# Patient Record
(demographics unavailable — no encounter records)

---

## 2024-11-19 NOTE — BEGINNING OF VISIT
[Patient] : patient [] :  [[employer] ] : provided by [employer]   [Time Spent: ____ minutes] : Total time spent using  services: [unfilled] minutes. The patient's primary language is not English thus required  services. [Interpreters_IDNumber] : [id number] [TWNoteComboBox1] : Azerbaijani

## 2024-11-19 NOTE — HISTORY OF PRESENT ILLNESS
[FreeTextEntry6] : 16 y.o female brought down by school guidance counselor  Kady moved from [place] 2 years ago  Currently attends [employer], in the 10th grade  NKDA Denies PMH,SX LMP approx 6 months ago   Currently in a relationship with male partner Mom aware of pregnancy, has upcoming appointment 24

## 2024-11-19 NOTE — DISCUSSION/SUMMARY
[FreeTextEntry1] : 16 y.o female presents to health center to establish care V/S stable  NKDA Dispensed and labeled #20 prenatal vitamins, take one daily  Counseling/education provided on health maintenance and promotion  Discussed important of prenatal care  Answered all questions  reviewed immunizations, UTD D/C to class, advise to RTC as needed

## 2024-12-06 NOTE — PHYSICAL EXAM
[Fully active, able to carry on all pre-disease performance without restriction] : Status 0 - Fully active, able to carry on all pre-disease performance without restriction [Normal] : affect appropriate [de-identified] : 31 weeks pregnant  [de-identified] : 31 weeks pregnant

## 2024-12-06 NOTE — PHYSICAL EXAM
[Fully active, able to carry on all pre-disease performance without restriction] : Status 0 - Fully active, able to carry on all pre-disease performance without restriction [Normal] : affect appropriate [de-identified] : 31 weeks pregnant  [de-identified] : 31 weeks pregnant

## 2024-12-06 NOTE — ASSESSMENT
[FreeTextEntry1] :  15 yo F presents to the office today for initial consultation for Anemia. Referred by OBGYN . Uzbek Speaker [phone] Kenny. A0, Due date- Not sure , 31 weeks (boy). This is the first time she was told she was Anemic. She has never taken PO Iron before. She never had a BLT. She had an Iron Infusion in the Maternity center only got 1 infusion. Menstrual cycle- regular, 5 days, 2-3 days heavy bleeding. Diet- poor diet in vegetables and red meat, eats mostly chicken. Pt states Pica Ice  Impression: Anemia in Pregnancy   Plan: Previous chart and previous labs reviewed. Labs on 24 reviewed and discussed with patient. WBC 8.34, Hgb 7.9, Hct 28.9, , MCV 70.3 HgbE- Hgb A- 98.1, HgbA2 1.9. --- Today ordered labs: CBC, Ferritin, TIBC, B12, Folate, Retic, MMA   --- Labs to be done 3 days prior when they RTC: CBC, Ferritin, TIBC  --- Schedule for Iron Infusion Venofer 200mg IV weekly x5 --- Pt will call us for lab results --- Pt was educated on a high iron diet --- Pt advised to follow up with their OBGYN, PCP as directed We explained possible side effect from Iron infusion is not limited to anaphylactic reaction, headache, hives, itching wheezing, difficulty breathing, a lightheaded feeling, swelling of face, lips, tongue or throat, delay reaction and abdominal discomfort. All questions were answered   RTC 1st weeks of March with JOBY Sanz

## 2024-12-06 NOTE — HISTORY OF PRESENT ILLNESS
[FreeTextEntry1] : CRISTO f/u Note  #[id number] Ms. Jurado is a 17 yo  @32w2d, LARON 25 by 31 week biometry, seen today for follow up of poor pregnancy dating. Pt managed by Dr. Montiel.  Pt first seen on  for initial scan. LMP unknown, pt late transfer with no care. Exact LARON uncertain, concern for possible IUGR.   Repeat BPN today 10/10, overall reassuring.   Pt is following with hematology for IV venofer infusion for anemia She has not completed her GCT, a script was provided today  I spoke with her today about the findings and all questions were answered to her satisfaction.  Continue weekly BPN, growth in 1 week to evaluate dating once again. routine care per Dr. Dinesh Jaeger MD Maternal Fetal Medicine  Total time spent 25 minutes with greater than 50% of time spent on counseling and coordination of care, excluding teaching and separately reported services.

## 2024-12-06 NOTE — ASSESSMENT
[FreeTextEntry1] :  15 yo F presents to the office today for initial consultation for Anemia. Referred by OBGYN . Czech Speaker [phone] Kenny. A0, Due date- Not sure , 31 weeks (boy). This is the first time she was told she was Anemic. She has never taken PO Iron before. She never had a BLT. She had an Iron Infusion in the Maternity center only got 1 infusion. Menstrual cycle- regular, 5 days, 2-3 days heavy bleeding. Diet- poor diet in vegetables and red meat, eats mostly chicken. Pt states Pica Ice  Impression: Anemia in Pregnancy   Plan: Previous chart and previous labs reviewed. Labs on 24 reviewed and discussed with patient. WBC 8.34, Hgb 7.9, Hct 28.9, , MCV 70.3 HgbE- Hgb A- 98.1, HgbA2 1.9. --- Today ordered labs: CBC, Ferritin, TIBC, B12, Folate, Retic, MMA   --- Labs to be done 3 days prior when they RTC: CBC, Ferritin, TIBC  --- Schedule for Iron Infusion Venofer 200mg IV weekly x5 --- Pt will call us for lab results --- Pt was educated on a high iron diet --- Pt advised to follow up with their OBGYN, PCP as directed We explained possible side effect from Iron infusion is not limited to anaphylactic reaction, headache, hives, itching wheezing, difficulty breathing, a lightheaded feeling, swelling of face, lips, tongue or throat, delay reaction and abdominal discomfort. All questions were answered   RTC 1st weeks of March with JOBY Sanz

## 2024-12-06 NOTE — HISTORY OF PRESENT ILLNESS
[Disease:__________________________] : Disease: [unfilled] [de-identified] : 17 yo F presents to the office today for initial consultation for Anemia. Referred by OBGYN . [place] citizen Speaker [phone] Kenny. A0, Due date- Not sure , 31 week (boy) Pt has a PMHx of none. Pt has a PSHx none. Pt has a FHx of Mother diabetes  Social History: non-drinker, non-drinker, single,    This is the first time she was told she was Anemic  She has never taken PO Iron before She never had a BLT She had an Iron Infusion in the Maternity center only got 1 infusion  Menstrual cycle- regular, 5 days, 2-3 days heavy bleeding Medication- Prenatal  Allergies- none Diet- poor diet in vegetables and red meat, eats mostly chicken   Pt states Pica Ice  Pt denies fever, diarrhea, fatigue, chills, nausea, vomiting, SOB, dyspnea, unintentional weight loss or bleeding. Pt denies palpitations, headache, weakness, dizziness.  Pt has not seen any blood in the stools or melena. No epistaxis, hematemesis or hemoptysis.   Healthcare Maintenace: PCP-   OBGYN- .  GI- Will find one  Colonoscopy- never Upper Endoscopy- never Mammography- never GYN Pelvic Exam/pap- never Breast Exam- never LMP- can't remember GPA0- A0 Due date- Not sure  31 weeks (boy)  Labs on 24 reviewed and discussed with patient. WBC 8.34, Hgb 7.9, Hct 28.9, , MCV 70.3 HgbE- Hgb A- 98.1, HgbA2 1.9

## 2024-12-06 NOTE — PHYSICAL EXAM
[Fully active, able to carry on all pre-disease performance without restriction] : Status 0 - Fully active, able to carry on all pre-disease performance without restriction [Normal] : affect appropriate [de-identified] : 31 weeks pregnant  [de-identified] : 31 weeks pregnant

## 2024-12-06 NOTE — HISTORY OF PRESENT ILLNESS
[Disease:__________________________] : Disease: [unfilled] [de-identified] : 17 yo F presents to the office today for initial consultation for Anemia. Referred by OBGYN . Niuean Speaker [phone] Kenny. A0, Due date- Not sure , 31 week (boy) Pt has a PMHx of none. Pt has a PSHx none. Pt has a FHx of Mother diabetes  Social History: non-drinker, non-drinker, single,    This is the first time she was told she was Anemic  She has never taken PO Iron before She never had a BLT She had an Iron Infusion in the Maternity center only got 1 infusion  Menstrual cycle- regular, 5 days, 2-3 days heavy bleeding Medication- Prenatal  Allergies- none Diet- poor diet in vegetables and red meat, eats mostly chicken   Pt states Pica Ice  Pt denies fever, diarrhea, fatigue, chills, nausea, vomiting, SOB, dyspnea, unintentional weight loss or bleeding. Pt denies palpitations, headache, weakness, dizziness.  Pt has not seen any blood in the stools or melena. No epistaxis, hematemesis or hemoptysis.   Healthcare Maintenace: PCP-   OBGYN- .  GI- Will find one  Colonoscopy- never Upper Endoscopy- never Mammography- never GYN Pelvic Exam/pap- never Breast Exam- never LMP- can't remember GPA0- A0 Due date- Not sure  31 weeks (boy)  Labs on 24 reviewed and discussed with patient. WBC 8.34, Hgb 7.9, Hct 28.9, , MCV 70.3 HgbE- Hgb A- 98.1, HgbA2 1.9

## 2024-12-06 NOTE — HISTORY OF PRESENT ILLNESS
[FreeTextEntry1] : CRISTO f/u Note  #[id number] Ms. Jurado is a 15 yo  @32w2d, LARON 25 by 31 week biometry, seen today for follow up of poor pregnancy dating. Pt managed by Dr. Montiel.  Pt first seen on  for initial scan. LMP unknown, pt late transfer with no care. Exact LARON uncertain, concern for possible IUGR.   Repeat BPN today 10/10, overall reassuring.   Pt is following with hematology for IV venofer infusion for anemia She has not completed her GCT, a script was provided today  I spoke with her today about the findings and all questions were answered to her satisfaction.  Continue weekly BPN, growth in 1 week to evaluate dating once again. routine care per Dr. Dinesh Jaeger MD Maternal Fetal Medicine  Total time spent 25 minutes with greater than 50% of time spent on counseling and coordination of care, excluding teaching and separately reported services.

## 2024-12-06 NOTE — HISTORY OF PRESENT ILLNESS
[Disease:__________________________] : Disease: [unfilled] [de-identified] : 15 yo F presents to the office today for initial consultation for Anemia. Referred by OBGYN . Uz[place] Speaker [phone] Kenny. A0, Due date- Not sure , 31 week (boy) Pt has a PMHx of none. Pt has a PSHx none. Pt has a FHx of Mother diabetes  Social History: non-drinker, non-drinker, single,    This is the first time she was told she was Anemic  She has never taken PO Iron before She never had a BLT She had an Iron Infusion in the Maternity center only got 1 infusion  Menstrual cycle- regular, 5 days, 2-3 days heavy bleeding Medication- Prenatal  Allergies- none Diet- poor diet in vegetables and red meat, eats mostly chicken   Pt states Pica Ice  Pt denies fever, diarrhea, fatigue, chills, nausea, vomiting, SOB, dyspnea, unintentional weight loss or bleeding. Pt denies palpitations, headache, weakness, dizziness.  Pt has not seen any blood in the stools or melena. No epistaxis, hematemesis or hemoptysis.   Healthcare Maintenace: PCP-   OBGYN- .  GI- Will find one  Colonoscopy- never Upper Endoscopy- never Mammography- never GYN Pelvic Exam/pap- never Breast Exam- never LMP- can't remember GPA0- A0 Due date- Not sure  31 weeks (boy)  Labs on 24 reviewed and discussed with patient. WBC 8.34, Hgb 7.9, Hct 28.9, , MCV 70.3 HgbE- Hgb A- 98.1, HgbA2 1.9

## 2024-12-06 NOTE — PHYSICAL EXAM
[Fully active, able to carry on all pre-disease performance without restriction] : Status 0 - Fully active, able to carry on all pre-disease performance without restriction [Normal] : affect appropriate [de-identified] : 31 weeks pregnant  [de-identified] : 31 weeks pregnant

## 2024-12-06 NOTE — ASSESSMENT
[FreeTextEntry1] :  15 yo F presents to the office today for initial consultation for Anemia. Referred by OBGYN . Sinhala Speaker [phone] Kenny. A0, Due date- Not sure , 31 weeks (boy). This is the first time she was told she was Anemic. She has never taken PO Iron before. She never had a BLT. She had an Iron Infusion in the Maternity center only got 1 infusion. Menstrual cycle- regular, 5 days, 2-3 days heavy bleeding. Diet- poor diet in vegetables and red meat, eats mostly chicken. Pt states Pica Ice  Impression: Anemia in Pregnancy   Plan: Previous chart and previous labs reviewed. Labs on 24 reviewed and discussed with patient. WBC 8.34, Hgb 7.9, Hct 28.9, , MCV 70.3 HgbE- Hgb A- 98.1, HgbA2 1.9. --- Today ordered labs: CBC, Ferritin, TIBC, B12, Folate, Retic, MMA   --- Labs to be done 3 days prior when they RTC: CBC, Ferritin, TIBC  --- Schedule for Iron Infusion Venofer 200mg IV weekly x5 --- Pt will call us for lab results --- Pt was educated on a high iron diet --- Pt advised to follow up with their OBGYN, PCP as directed We explained possible side effect from Iron infusion is not limited to anaphylactic reaction, headache, hives, itching wheezing, difficulty breathing, a lightheaded feeling, swelling of face, lips, tongue or throat, delay reaction and abdominal discomfort. All questions were answered   RTC 1st weeks of March with JOBY Sanz

## 2024-12-06 NOTE — ASSESSMENT
[FreeTextEntry1] :  17 yo F presents to the office today for initial consultation for Anemia. Referred by OBGYN . Belarusian Speaker [phone] Kenny. A0, Due date- Not sure , 31 weeks (boy). This is the first time she was told she was Anemic. She has never taken PO Iron before. She never had a BLT. She had an Iron Infusion in the Maternity center only got 1 infusion. Menstrual cycle- regular, 5 days, 2-3 days heavy bleeding. Diet- poor diet in vegetables and red meat, eats mostly chicken. Pt states Pica Ice  Impression: Anemia in Pregnancy   Plan: Previous chart and previous labs reviewed. Labs on 24 reviewed and discussed with patient. WBC 8.34, Hgb 7.9, Hct 28.9, , MCV 70.3 HgbE- Hgb A- 98.1, HgbA2 1.9. --- Today ordered labs: CBC, Ferritin, TIBC, B12, Folate, Retic, MMA   --- Labs to be done 3 days prior when they RTC: CBC, Ferritin, TIBC  --- Schedule for Iron Infusion Venofer 200mg IV weekly x5 --- Pt will call us for lab results --- Pt was educated on a high iron diet --- Pt advised to follow up with their OBGYN, PCP as directed We explained possible side effect from Iron infusion is not limited to anaphylactic reaction, headache, hives, itching wheezing, difficulty breathing, a lightheaded feeling, swelling of face, lips, tongue or throat, delay reaction and abdominal discomfort. All questions were answered   RTC 1st weeks of March with JOBY Sanz

## 2024-12-06 NOTE — BEGINNING OF VISIT
[0] : 2) Feeling down, depressed, or hopeless: Not at all (0) [PHQ-2 Negative] : PHQ-2 Negative [Pain Scale: ___] : On a scale of 1-10, today the patient's pain is a(n) [unfilled]. [Never] : Never [Patient/Caregiver not ready to engage] : Patient/Caregiver not ready to engage

## 2024-12-06 NOTE — HISTORY OF PRESENT ILLNESS
[Disease:__________________________] : Disease: [unfilled] [de-identified] : 15 yo F presents to the office today for initial consultation for Anemia. Referred by OBGYN . Monegasque Speaker [phone] Kenny. A0, Due date- Not sure , 31 week (boy) Pt has a PMHx of none. Pt has a PSHx none. Pt has a FHx of Mother diabetes  Social History: non-drinker, non-drinker, single,    This is the first time she was told she was Anemic  She has never taken PO Iron before She never had a BLT She had an Iron Infusion in the Maternity center only got 1 infusion  Menstrual cycle- regular, 5 days, 2-3 days heavy bleeding Medication- Prenatal  Allergies- none Diet- poor diet in vegetables and red meat, eats mostly chicken   Pt states Pica Ice  Pt denies fever, diarrhea, fatigue, chills, nausea, vomiting, SOB, dyspnea, unintentional weight loss or bleeding. Pt denies palpitations, headache, weakness, dizziness.  Pt has not seen any blood in the stools or melena. No epistaxis, hematemesis or hemoptysis.   Healthcare Maintenace: PCP-   OBGYN- .  GI- Will find one  Colonoscopy- never Upper Endoscopy- never Mammography- never GYN Pelvic Exam/pap- never Breast Exam- never LMP- can't remember GPA0- A0 Due date- Not sure  31 weeks (boy)  Labs on 24 reviewed and discussed with patient. WBC 8.34, Hgb 7.9, Hct 28.9, , MCV 70.3 HgbE- Hgb A- 98.1, HgbA2 1.9
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
## 2024-12-12 NOTE — END OF VISIT
[] : Resident [FreeTextEntry3] : See my note above.  Time spent excludes resident teaching time.  [Time Spent: ___ minutes] : I have spent [unfilled] minutes of time on the encounter which excludes teaching and separately reported services.
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
## 2025-03-11 NOTE — HISTORY OF PRESENT ILLNESS
[FreeTextEntry1] : ----17 Y/O  HERE FOR PP EXAM;  BOY NOT NURSING;25 PMHX;/ PSHX;/ SOCIAL HX;-ETOH -CIGG  STD;   /        STD PREVENTION DISCUSSED FAMILY HISTORY OF BREAST CANCER;NMO REVIEW OF SYMPTOMS DONE; ALLERGIES; pt answered NKDA Medication reconciliation was completed by reviewing, with the patient's involvement, the patient's current outpatient medications and those  ordered for the patient today.           PE; ABDOMEN;SOFT NT ND   NL GENITALIA VAGINA -DC CX -CMT UTERUS NL SIZE NT ADNEXA NT -MASSES EXT -CCE  A;P;PP EXAM -CONTRACEPTION DISCUSSED -RTC FOR PARAGARD IUD  RBA DISCUSSED --F-U AFTER ABOVE.
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
